# Patient Record
Sex: MALE | Race: BLACK OR AFRICAN AMERICAN | NOT HISPANIC OR LATINO | Employment: OTHER | URBAN - METROPOLITAN AREA
[De-identification: names, ages, dates, MRNs, and addresses within clinical notes are randomized per-mention and may not be internally consistent; named-entity substitution may affect disease eponyms.]

---

## 2017-03-31 RX ORDER — HYDROCODONE BITARTRATE AND ACETAMINOPHEN 5; 325 MG/1; MG/1
1 TABLET ORAL EVERY 6 HOURS PRN
Qty: 45 TABLET | Refills: 0 | Status: SHIPPED | OUTPATIENT
Start: 2017-03-31 | End: 2017-07-06 | Stop reason: SDUPTHER

## 2017-04-02 ENCOUNTER — HOSPITAL ENCOUNTER (EMERGENCY)
Facility: HOSPITAL | Age: 82
Discharge: NURSING, REHAB OR LONG-TERM CARE | End: 2017-04-02
Attending: EMERGENCY MEDICINE
Payer: MEDICARE

## 2017-04-02 VITALS
DIASTOLIC BLOOD PRESSURE: 63 MMHG | WEIGHT: 197 LBS | RESPIRATION RATE: 12 BRPM | TEMPERATURE: 98 F | HEART RATE: 75 BPM | OXYGEN SATURATION: 96 % | SYSTOLIC BLOOD PRESSURE: 113 MMHG | BODY MASS INDEX: 29.86 KG/M2 | HEIGHT: 68 IN

## 2017-04-02 DIAGNOSIS — R10.84 GENERALIZED ABDOMINAL PAIN: Primary | ICD-10-CM

## 2017-04-02 LAB
ALBUMIN SERPL BCP-MCNC: 3.4 G/DL
ALP SERPL-CCNC: 57 U/L
ALT SERPL W/O P-5'-P-CCNC: 19 U/L
ANION GAP SERPL CALC-SCNC: 10 MMOL/L
AST SERPL-CCNC: 21 U/L
BASOPHILS # BLD AUTO: 0.03 K/UL
BASOPHILS NFR BLD: 0.4 %
BILIRUB SERPL-MCNC: 0.2 MG/DL
BILIRUB UR QL STRIP: NEGATIVE
BUN SERPL-MCNC: 14 MG/DL
CALCIUM SERPL-MCNC: 8.9 MG/DL
CHLORIDE SERPL-SCNC: 107 MMOL/L
CLARITY UR: CLEAR
CO2 SERPL-SCNC: 22 MMOL/L
COLOR UR: YELLOW
CREAT SERPL-MCNC: 0.8 MG/DL
DIFFERENTIAL METHOD: ABNORMAL
EOSINOPHIL # BLD AUTO: 0.4 K/UL
EOSINOPHIL NFR BLD: 6 %
ERYTHROCYTE [DISTWIDTH] IN BLOOD BY AUTOMATED COUNT: 15.1 %
EST. GFR  (AFRICAN AMERICAN): >60 ML/MIN/1.73 M^2
EST. GFR  (NON AFRICAN AMERICAN): >60 ML/MIN/1.73 M^2
GLUCOSE SERPL-MCNC: 90 MG/DL
GLUCOSE UR QL STRIP: NEGATIVE
HCT VFR BLD AUTO: 40 %
HGB BLD-MCNC: 12.7 G/DL
HGB UR QL STRIP: NEGATIVE
KETONES UR QL STRIP: NEGATIVE
LEUKOCYTE ESTERASE UR QL STRIP: NEGATIVE
LIPASE SERPL-CCNC: 38 U/L
LYMPHOCYTES # BLD AUTO: 2.2 K/UL
LYMPHOCYTES NFR BLD: 30.8 %
MCH RBC QN AUTO: 28 PG
MCHC RBC AUTO-ENTMCNC: 31.8 %
MCV RBC AUTO: 88 FL
MONOCYTES # BLD AUTO: 0.6 K/UL
MONOCYTES NFR BLD: 7.6 %
NEUTROPHILS # BLD AUTO: 4 K/UL
NEUTROPHILS NFR BLD: 54.9 %
NITRITE UR QL STRIP: NEGATIVE
PH UR STRIP: 6 [PH] (ref 5–8)
PLATELET # BLD AUTO: 178 K/UL
PMV BLD AUTO: 12.3 FL
POTASSIUM SERPL-SCNC: 4.5 MMOL/L
PROT SERPL-MCNC: 6.9 G/DL
PROT UR QL STRIP: NEGATIVE
RBC # BLD AUTO: 4.53 M/UL
SODIUM SERPL-SCNC: 139 MMOL/L
SP GR UR STRIP: 1.01 (ref 1–1.03)
URN SPEC COLLECT METH UR: NORMAL
UROBILINOGEN UR STRIP-ACNC: NEGATIVE EU/DL
WBC # BLD AUTO: 7.2 K/UL

## 2017-04-02 PROCEDURE — 80053 COMPREHEN METABOLIC PANEL: CPT

## 2017-04-02 PROCEDURE — 83690 ASSAY OF LIPASE: CPT

## 2017-04-02 PROCEDURE — 81003 URINALYSIS AUTO W/O SCOPE: CPT

## 2017-04-02 PROCEDURE — 25500020 PHARM REV CODE 255: Performed by: EMERGENCY MEDICINE

## 2017-04-02 PROCEDURE — 93010 ELECTROCARDIOGRAM REPORT: CPT | Mod: ,,, | Performed by: INTERNAL MEDICINE

## 2017-04-02 PROCEDURE — 85025 COMPLETE CBC W/AUTO DIFF WBC: CPT

## 2017-04-02 PROCEDURE — 93005 ELECTROCARDIOGRAM TRACING: CPT

## 2017-04-02 PROCEDURE — 99284 EMERGENCY DEPT VISIT MOD MDM: CPT

## 2017-04-02 RX ORDER — POLYETHYLENE GLYCOL 3350 17 G/17G
17 POWDER, FOR SOLUTION ORAL DAILY
Qty: 289 G | Refills: 0 | Status: SHIPPED | OUTPATIENT
Start: 2017-04-02

## 2017-04-02 RX ADMIN — IOHEXOL 100 ML: 350 INJECTION, SOLUTION INTRAVENOUS at 09:04
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
## 2017-04-02 NOTE — ED AVS SNAPSHOT
[facility]  [address]  [place] [address]               [facility] [place]   2017  6:10 PM   ED    Description:  Male : 1935   Department:  [facility]           Your Care was Coordinated By:     Provider Role From To    Cristobal Larsen MD Attending Provider 17 [phone] --      Reason for Visit     Altered Mental Status     Abdominal Pain           Diagnoses this Visit        Comments    Generalized abdominal pain    -  Primary       ED Disposition     ED Disposition Condition Comment    Discharge             To Do List           Follow-up Information     Follow up with Orlando Araujo MD In 2 days.    Specialty:  Internal Medicine    Contact information:    [address]  [address]  [facility] [address]  [phone]         These Medications        Disp Refills Start End    polyethylene glycol (GLYCOLAX) 17 gram/dose powder 289 g 0 2017     Take 17 g by mouth once daily. - Oral    Pharmacy: [facility] - [address] Ph #: [phone]         [facility]     [facility] -  Assistance  Unless otherwise directed by your provider, please contact [facility], our nurse care line that is available for  assistance.     Registered nurses in the [facility] provide: appointment scheduling, clinical advisement, health education, and other advisory services.  Call: [phone] (toll free)               Medications           Message regarding Medications     Verify the changes and/or additions to your medication regime listed below are the same as discussed with your clinician today.  If any of these changes or additions are incorrect, please notify your healthcare provider.        START taking these NEW medications        Refills    polyethylene glycol (GLYCOLAX) 17 gram/dose powder 0    Sig: Take 17 g by mouth once daily.    Class: Print    Route: Oral       These medications were administered today        Dose Freq    omnipaque 350 iohexol 100 mL 100 mL IMG once as needed    Sig: Inject 100 mLs into the vein ONCE PRN for contrast.    Class: Normal    Route: Intravenous           Verify that the below list of medications is an accurate representation of the medications you are currently taking.  If none reported, the list may be blank. If incorrect, please contact your healthcare provider. Carry this list with you in case of emergency.           Current Medications     levothyroxine (SYNTHROID) 50 MCG tablet Take 50 mcg by mouth once daily.    montelukast (SINGULAIR) 10 mg tablet Take 10 mg by mouth once daily.     albuterol (PROVENTIL) 2.5 mg /3 mL (0.083 %) nebulizer solution Take 2.5 mg by nebulization every 6 (six) hours as needed for Wheezing or Shortness of Breath.    baclofen (LIORESAL) 20 MG tablet Take 20 mg by mouth 3 (three) times daily.    beclomethasone (QVAR) 80 mcg/actuation Aero Inhale 2 puffs into the lungs 2 (two) times daily.    carboxymethylcellulose sodium (REFRESH TEARS) 0.5 % Drop Apply 1 drop to eye every other day. For eye dryness.    carvedilol (COREG) 3.125 MG tablet Take 3.125 mg by mouth 2 (two) times daily with meals.    cetirizine (ZYRTEC) 10 MG tablet Take 10 mg by mouth once daily.    duloxetine (CYMBALTA) 60 MG capsule Take 60 mg by mouth once daily.    ferrous sulfate 325 (65 FE) MG EC tablet Take 325 mg by mouth 2 (two) times daily.    flunisolide 29 mcg (0.025 %) Spry 1 spray by Nasal route 2 (two) times daily.     guaifenesin (MUCINEX) 600 mg 12 hr tablet Take 600 mg by mouth 2 (two) times daily as needed for Congestion.    GUAIFENESIN/DEXTROMETHORPHAN (ROBITUSSIN-DM ORAL) Take 10 mLs by mouth every 6 (six) hours as needed (cough).    hydrocodone-acetaminophen 5-325mg (NORCO) 5-325 mg per tablet Take 1 tablet by mouth every 6 (six) hours as needed for Pain.    loperamide (IMODIUM) 2 mg capsule Take 2 mg by mouth every 8 (eight)  "hours as needed for Diarrhea.    omeprazole (PRILOSEC) 20 MG capsule Take 20 mg by mouth once daily.    polyethylene glycol (GLYCOLAX) 17 gram PwPk Take 17 g by mouth as needed (constipation).    polyethylene glycol (GLYCOLAX) 17 gram/dose powder Take 17 g by mouth once daily.    rivaroxaban (XARELTO) 20 mg Tab Take 20 mg by mouth once daily.    simvastatin (ZOCOR) 20 MG tablet Take 20 mg by mouth every evening.    trazodone (DESYREL) 50 MG tablet Take 50 mg by mouth every evening.           Clinical Reference Information           Your Vitals Were     BP Pulse Temp Resp Height Weight    134/88 66 97.7 °F (36.5 °C) (Oral) 15 5' 8" (1.727 m) 89.4 kg (197 lb)    SpO2 BMI             95% 29.95 kg/m2         Allergies as of 4/2/2017        Reactions    Aspirin Hives, Shortness Of Breath, Swelling      Immunizations Administered on Date of Encounter - 4/2/2017     None      ED Micro, Lab, POCT     Start Ordered       Status Ordering Provider    04/02/17 1833 04/02/17 1833  CBC auto differential  STAT      Final result     04/02/17 1833 04/02/17 1833  Comprehensive metabolic panel  STAT      Final result     04/02/17 1833 04/02/17 1833  Urinalysis  STAT      Final result     04/02/17 1833 04/02/17 1833  Lipase  STAT      Final result       ED Imaging Orders     Start Ordered       Status Ordering Provider    04/02/17 2059 04/02/17 2059  CT Abdomen Pelvis With Contrast  1 time imaging      Final result         Discharge Instructions         Take your medications as prescribed.  Follow up with your primary care physician if you're not improving in 2-3 days.  Please refer to the additional material providing further information including when return to the emergency department.  Abdominal Pain    Abdominal pain is pain in the stomach or belly area. Everyone has this pain from time to time. In many cases it goes away on its own. But abdominal pain can sometimes be due to a serious problem, such as appendicitis. So its " important to know when to seek help.  Causes of abdominal pain  There are many possible causes of abdominal pain. Common causes in adults include:  · Constipation, diarrhea, or gas  · Stomach acid flowing back up into the esophagus (acid reflux or heartburn)  · Severe acid reflux, called GERD (gastroesophageal reflux disease)  · A sore in the lining of the stomach or small intestine (peptic ulcer)  · Inflammation of the gallbladder, liver, or pancreas  · Gallstones or kidney stones  · Appendicitis   · Intestinal blockage   · An internal organ pushing through a muscle or other tissue (hernia)  · Urinary tract infections  · In women, menstrual cramps, fibroids, or endometriosis  · Inflammation or infection of the intestines  Diagnosing the cause of abdominal pain  Your healthcare provider will do a physical exam help find the cause of your pain. If needed, tests will be ordered. Belly pain has many possible causes. So it can be hard to find the reason for your pain. Giving details about your pain can help. Tell your provider where and when you feel the pain, and what makes it better or worse. Also let your provider know if you have other symptoms such as:  · Fever  · Tiredness  · Upset stomach (nausea)  · Vomiting  · Changes in bathroom habits  Treating abdominal pain  Some causes of pain need emergency medical treatment right away. These include appendicitis or a bowel blockage. Other problems can be treated with rest, fluids, or medicines. Your healthcare provider can give you specific instructions for treatment or self-care based on what is causing your pain.  If you have vomiting or diarrhea, sip water or other clear fluids. When you are ready to eat solid foods again, start with small amounts of easy-to-digest, low-fat foods. These include apple sauce, toast, or crackers.   When to seek medical care  Call 911 or go to the hospital right away if you:  · Cant pass stool and are vomiting  · Are vomiting blood or  have bloody diarrhea or black, tarry diarrhea  · Have chest, neck, or shoulder pain  · Feel like you might pass out  · Have pain in your shoulder blades with nausea  · Have sudden, severe belly pain  · Have new, severe pain unlike any you have felt before  · Have a belly that is rigid, hard, and tender to touch  Call your healthcare provider if you have:  · Pain for more than 5 days  · Bloating for more than 2 days  · Diarrhea for more than 5 days  · A fever of 100.4°F (38.0°C) or higher, or as directed by your provider  · Pain that gets worse  · Weight loss for no reason  · Continued lack of appetite  · Blood in your stool  How to prevent abdominal pain  Here are some tips to help prevent abdominal pain:  · Eat smaller amounts of food at one time.  · Avoid greasy, fried, or other high-fat foods.  · Avoid foods that give you gas.  · Exercise regularly.  · Drink plenty of fluids.  To help prevent GERD symptoms:  · Quit smoking.  · Reduce alcohol and certain foods that increase stomach acid.  · Avoid aspirin and over-the-counter pain and fever medicines (NSAIDS or nonsteroidal anti-inflammatory drugs), if possible  · Lose extra weight.  · Finish eating at least 2 hours before you go to bed or lie down.  · Raise the head of your bed.  Date Last Reviewed: 7/1/2016  © 7478-5774 [employer]. [address] [place] [address]. All rights reserved. This information is not intended as a substitute for professional medical care. Always follow your healthcare professional's instructions.          [facility] Sign-Up     Activating your [facility] account is as easy as 1-2-3!     1) Visit [email or web address], select Sign Up Now, enter this activation code and your date of birth, then select Next.  [email or web address]  Expires: 5/17/2017 10:00 PM      2) Create a username and password to use when you visit [facility] in the future and select a security question in case you lose your password and select Next.    3)  Enter your e-mail address and click Sign Up!    Additional Information  If you have questions, please e-mail [email or web address] or call [phone] to talk to our [facility] staff. Remember, [facility] is NOT to be used for urgent needs. For medical emergencies, dial 911.          [facility] complies with applicable Federal civil rights laws and does not discriminate on the basis of race, color, national origin, age, disability, or sex.        Language Assistance Services     ATTENTION: Language assistance services are available, free of charge. Please call [phone].      ATENCIÓN: Si habla español, tiene a field disposición servicios gratuitos de asistencia lingüística. Llame al [phone].     CHÚ Ý: N?u b?n nói Ti?ng Vi?t, có các d?ch v? h? tr? ngôn ng? mi?n phí dành cho b?n. G?i s? [phone].

## 2017-04-03 NOTE — ED TRIAGE NOTES
Pt. Care assumed, pt. Awake, alert and oriented, skin PWD. Denies c/o pain or discomfort. Pt. Aware of plan of care to return to [facility]. Pt. Given a blanket for comfort, will continue to monitor.

## 2017-04-03 NOTE — ED NOTES
Report called to [facility]. --Bee-with results, VS and recc for stool softener/laxative.

## 2017-04-03 NOTE — DISCHARGE INSTRUCTIONS
Take your medications as prescribed.  Follow up with your primary care physician if you're not improving in 2-3 days.  Please refer to the additional material providing further information including when return to the emergency department.  Abdominal Pain    Abdominal pain is pain in the stomach or belly area. Everyone has this pain from time to time. In many cases it goes away on its own. But abdominal pain can sometimes be due to a serious problem, such as appendicitis. So its important to know when to seek help.  Causes of abdominal pain  There are many possible causes of abdominal pain. Common causes in adults include:  · Constipation, diarrhea, or gas  · Stomach acid flowing back up into the esophagus (acid reflux or heartburn)  · Severe acid reflux, called GERD (gastroesophageal reflux disease)  · A sore in the lining of the stomach or small intestine (peptic ulcer)  · Inflammation of the gallbladder, liver, or pancreas  · Gallstones or kidney stones  · Appendicitis   · Intestinal blockage   · An internal organ pushing through a muscle or other tissue (hernia)  · Urinary tract infections  · In women, menstrual cramps, fibroids, or endometriosis  · Inflammation or infection of the intestines  Diagnosing the cause of abdominal pain  Your healthcare provider will do a physical exam help find the cause of your pain. If needed, tests will be ordered. Belly pain has many possible causes. So it can be hard to find the reason for your pain. Giving details about your pain can help. Tell your provider where and when you feel the pain, and what makes it better or worse. Also let your provider know if you have other symptoms such as:  · Fever  · Tiredness  · Upset stomach (nausea)  · Vomiting  · Changes in bathroom habits  Treating abdominal pain  Some causes of pain need emergency medical treatment right away. These include appendicitis or a bowel blockage. Other problems can be treated with rest, fluids, or  medicines. Your healthcare provider can give you specific instructions for treatment or self-care based on what is causing your pain.  If you have vomiting or diarrhea, sip water or other clear fluids. When you are ready to eat solid foods again, start with small amounts of easy-to-digest, low-fat foods. These include apple sauce, toast, or crackers.   When to seek medical care  Call 911 or go to the hospital right away if you:  · Cant pass stool and are vomiting  · Are vomiting blood or have bloody diarrhea or black, tarry diarrhea  · Have chest, neck, or shoulder pain  · Feel like you might pass out  · Have pain in your shoulder blades with nausea  · Have sudden, severe belly pain  · Have new, severe pain unlike any you have felt before  · Have a belly that is rigid, hard, and tender to touch  Call your healthcare provider if you have:  · Pain for more than 5 days  · Bloating for more than 2 days  · Diarrhea for more than 5 days  · A fever of 100.4°F (38.0°C) or higher, or as directed by your provider  · Pain that gets worse  · Weight loss for no reason  · Continued lack of appetite  · Blood in your stool  How to prevent abdominal pain  Here are some tips to help prevent abdominal pain:  · Eat smaller amounts of food at one time.  · Avoid greasy, fried, or other high-fat foods.  · Avoid foods that give you gas.  · Exercise regularly.  · Drink plenty of fluids.  To help prevent GERD symptoms:  · Quit smoking.  · Reduce alcohol and certain foods that increase stomach acid.  · Avoid aspirin and over-the-counter pain and fever medicines (NSAIDS or nonsteroidal anti-inflammatory drugs), if possible  · Lose extra weight.  · Finish eating at least 2 hours before you go to bed or lie down.  · Raise the head of your bed.  Date Last Reviewed: 7/1/2016  © 5524-0520 [employer]. [address] [place] [address], [place] [address]. All rights reserved. This information is not intended as a substitute for professional  medical care. Always follow your healthcare professional's instructions.

## 2017-04-03 NOTE — ED PROVIDER NOTES
Encounter Date: 4/2/2017       History     Chief Complaint   Patient presents with    Altered Mental Status     ns[facility] home said pt  confused, pt not confused when ems arrived.    Abdominal Pain     Review of patient's allergies indicates:   Allergen Reactions    Aspirin Hives, Shortness Of Breath and Swelling     HPI Comments: CHIEF COMPLAINT: Abdominal pain    HISTORY OF PRESENT ILLNESS: This is an 81-year-old who is brought into the emergency department from the nursing home today for complaint of abdominal pain.  According to EMS they were also told that the patient had altered mental status but upon their arrival the patient was talking and responding. The patient reports here today that he was having some abdominal pain today around the area of his stoma to his colostomy bag. It occurred when they were changing it today. It no longer hurts at that area. He does report feeling more bloated lately.  He has had no vomiting. No loose stools. Stool in bag does not appear watery. No change in urination. No fever, chills or sweats.     REVIEW OF SYSTEMS:  Constitutional: No fever, no chills.  Eyes: No discharge. No pain.  HENT: No nasal drainage. No ear ache. No sore throat.  Cardiovascular: No chest pain, no palpitations.  Respiratory: No cough, no shortness of breath.  Gastrointestinal: See above.   Genitourinary: No hematuria, dysuria, urgency.  Musculoskeletal: No back pain.  Skin: No rashes, no lesions.  Neurological: No headache, no focal weakness.    ALLERGIES reviewed  Family history reviewed  Home medications reviewed  Problem list reviewed        The history is provided by the patient.     Past Medical History:   Diagnosis Date    Allergy     Anxiety     Asthma     Colon polyp     benign    Depression     Elevated PSA     Encounter for blood transfusion 10/5/2014    for GI bleed anemia    Gastritis     GI bleed     Hyperlipemia     Hypertension     Hypothyroidism     Insomnia     Malignant  neoplasm of colon, unspecified site     Malignant neoplasm of prostate     Multiple sclerosis     Osteoporosis     Paroxysmal atrial fibrillation     Phlebitis and thrombophlebitis of unspecified femoral vein     Prostate cancer      Past Surgical History:   Procedure Laterality Date    APPENDECTOMY      BOWEL RESECTION      COLON SURGERY      COLONOSCOPY N/A 12/15/2015    Procedure: COLONOSCOPY;  Surgeon: Amari Busby MD;  Location: [facility] ([address] [place] [address]);  Service: Endoscopy;  Laterality: N/A;    CYSTOSCOPY      HERNIA REPAIR      LEFT COLECTOMY Left 12/21/2015    colon cancer     Family History   Problem Relation Age of Onset    Cervical cancer Sister     Breast cancer Sister     Heart disease Father     Stroke Father      Social History   Substance Use Topics    Smoking status: Never Smoker    Smokeless tobacco: None    Alcohol use No     Review of Systems   All other systems reviewed and are negative.      Physical Exam   Initial Vitals   BP Pulse Resp Temp SpO2   04/02/17 1759 04/02/17 1759 04/02/17 1759 -- 04/02/17 1759   150/6 74 18  96 %     Physical Exam    Nursing note and vitals reviewed.  Constitutional: He appears well-developed and well-nourished. He is not diaphoretic. No distress.   HENT:   Head: Normocephalic and atraumatic.   Nose: Nose normal.   Mouth/Throat: Oropharynx is clear and moist.   Eyes: Conjunctivae and EOM are normal. Pupils are equal, round, and reactive to light. No scleral icterus.   Neck: Normal range of motion. Neck supple. No JVD present.   Cardiovascular: Normal rate, regular rhythm and normal heart sounds. Exam reveals no gallop and no friction rub.    No murmur heard.  Pulmonary/Chest: Breath sounds normal. No stridor. No respiratory distress. He has no wheezes. He exhibits no tenderness.   Abdominal: Soft. Bowel sounds are normal. He exhibits no mass. There is no tenderness. There is no rebound and no guarding.   Obese abdomen but soft. Colostomy bag  in place. NTTP around the bag site. NTTP in all quadrants of the abdomen. No guarding or rebound. No pulsatile mass.    Musculoskeletal: He exhibits no edema.   Contractures present to the right upper extremity secondary to MS, No edema to the LE. NTTP.    Lymphadenopathy:     He has no cervical adenopathy.   Neurological: He is alert and oriented to person, place, and time. No cranial nerve deficit.   Skin: Skin is warm and dry. No rash noted. No pallor.         ED Course   Procedures  Labs Reviewed   CBC W/ AUTO DIFFERENTIAL   COMPREHENSIVE METABOLIC PANEL   URINALYSIS   LIPASE     EKG Readings: (Independently Interpreted)   Initial Reading: No STEMI.   71 bpm, normal sinus rhythm, ST and T wave abnormalities in the inferior leads, ST and T-wave abnormalities in the anterior lateral leads, prolonged QTC, abnormal EKG but similar to previous.          Medical Decision Making:   History:   Old Records Summarized: records from clinic visits.  Initial Assessment:   This is an 81-year-old presents to the emergency department with some abdominal pain that was centered around the colostomy site is been diffusely.  We will obtain labs and likely get a CT scan for further assessment.  Differential Diagnosis:   Gastritis, gastroenteritis, cholecystitis, cholelithiasis, pancreatitis, small bowel obstruction, ileus, appendicitis, urinary tract infection.  Independently Interpreted Test(s):   I have ordered and independently interpreted EKG Reading(s) - see prior notes  Clinical Tests:   Lab Tests: Ordered and Reviewed  Radiological Study: Ordered and Reviewed  Medical Tests: Ordered and Reviewed  ED Management:  Patient presented to the emergency Department today with complaint of abdominal pain.  At this time his workup does not reveal any acute abnormality.  CT scan does not show any signs of obstruction, no signs of inflammation, no signs of infection or abscess.  At this time there is no need for emergent hospitalization.   We will prescribe him some MiraLAX in order to encourage softer stool passage and have him follow-up with his primary care physician.  The patient is comfortable with this plan.  After taking into careful account the historical factors and physical exam findings of the patient's presentation today, in conjunction with the empirical and objective data obtained on ED workup, no acute emergent medical condition has been identified. The patient appears to be low risk for an emergent medical condition and I feel it is safe and appropriate at this time for the patient to be discharged to follow-up as detailed in their discharge instructions for reevaluation and possible continued outpatient workup and management. I have discussed the specifics of the workup with the patient and the patient has verbalized understanding of the details of the workup, the diagnosis, the treatment plan, and the need for outpatient follow-up. In addition, I have advised the patient that they can return to the ED and/or activate EMS at any time with worsening of their symptoms, change of their symptoms, or with any other medical complaint. The patient remained comfortable and stable during their visit in the ED.  Discharge and follow-up instructions discussed with the patient who expressed understanding and willingness to comply with my recommendations.     This medical record was prepared using voice dictation software. There may be phonetic errors.                     ED Course    the patient has remained stable here in the emergency department.  He has had no signs of any altered mental status here in the emergency department.  Given his pain is pain around the colostomy site we will get a CT scan for further evaluation.    The patient's CT scan shows no acute abnormality which required hospitalization at this time.  He will be discharged home on MiraLAX to follow-up with his primary care physician.  The patient is comfortable with this plan  and comfortable going home at this time.  Clinical Impression:   The encounter diagnosis was Generalized abdominal pain.          Cristobal Larsen MD  04/02/17 3664

## 2017-05-04 PROCEDURE — 99304 1ST NF CARE SF/LOW MDM 25: CPT | Mod: ,,, | Performed by: INTERNAL MEDICINE

## 2017-05-10 ENCOUNTER — OUTSIDE PLACE OF SERVICE (OUTPATIENT)
Dept: ADMINISTRATIVE | Facility: OTHER | Age: 82
End: 2017-05-10
Payer: MEDICARE

## 2017-07-06 DIAGNOSIS — M79.10 MUSCULAR PAIN: Primary | ICD-10-CM

## 2017-07-06 RX ORDER — HYDROCODONE BITARTRATE AND ACETAMINOPHEN 5; 325 MG/1; MG/1
1 TABLET ORAL EVERY 6 HOURS PRN
Qty: 45 TABLET | Refills: 0 | Status: SHIPPED | OUTPATIENT
Start: 2017-07-06

## 2017-07-13 ENCOUNTER — OUTSIDE PLACE OF SERVICE (OUTPATIENT)
Dept: INTERNAL MEDICINE | Facility: CLINIC | Age: 82
End: 2017-07-13
Payer: MEDICARE

## 2017-07-13 PROCEDURE — 99309 SBSQ NF CARE MODERATE MDM 30: CPT | Mod: ,,, | Performed by: INTERNAL MEDICINE

## 2017-10-19 ENCOUNTER — OUTSIDE PLACE OF SERVICE (OUTPATIENT)
Dept: INTERNAL MEDICINE | Facility: CLINIC | Age: 82
End: 2017-10-19
Payer: MEDICARE

## 2017-10-19 PROCEDURE — 99308 SBSQ NF CARE LOW MDM 20: CPT | Mod: ,,, | Performed by: INTERNAL MEDICINE

## 2017-10-26 ENCOUNTER — OUTSIDE PLACE OF SERVICE (OUTPATIENT)
Dept: INTERNAL MEDICINE | Facility: CLINIC | Age: 82
End: 2017-10-26
Payer: MEDICARE

## 2017-10-26 PROCEDURE — 99308 SBSQ NF CARE LOW MDM 20: CPT | Mod: ,,, | Performed by: INTERNAL MEDICINE

## 2017-12-04 ENCOUNTER — HOSPITAL ENCOUNTER (EMERGENCY)
Facility: HOSPITAL | Age: 82
Discharge: HOME | End: 2017-12-04
Attending: EMERGENCY MEDICINE
Payer: MEDICARE

## 2017-12-04 VITALS
WEIGHT: 197 LBS | RESPIRATION RATE: 18 BRPM | DIASTOLIC BLOOD PRESSURE: 87 MMHG | OXYGEN SATURATION: 95 % | HEART RATE: 89 BPM | SYSTOLIC BLOOD PRESSURE: 151 MMHG | BODY MASS INDEX: 29.86 KG/M2 | HEIGHT: 68 IN | TEMPERATURE: 98 F

## 2017-12-04 DIAGNOSIS — R07.89 CHEST WALL PAIN: Primary | ICD-10-CM

## 2017-12-04 DIAGNOSIS — J45.21 MILD INTERMITTENT ASTHMA WITH EXACERBATION: ICD-10-CM

## 2017-12-04 DIAGNOSIS — R07.9 CHEST PAIN: ICD-10-CM

## 2017-12-04 LAB
ALBUMIN SERPL BCP-MCNC: 3.7 G/DL
ALP SERPL-CCNC: 68 U/L
ALT SERPL W/O P-5'-P-CCNC: 14 U/L
ANION GAP SERPL CALC-SCNC: 8 MMOL/L
APTT BLDCRRT: 30.4 SEC
AST SERPL-CCNC: 19 U/L
BASOPHILS # BLD AUTO: 0.02 K/UL
BASOPHILS NFR BLD: 0.3 %
BILIRUB SERPL-MCNC: 0.7 MG/DL
BNP SERPL-MCNC: 125 PG/ML
BUN SERPL-MCNC: 11 MG/DL
CALCIUM SERPL-MCNC: 9.3 MG/DL
CHLORIDE SERPL-SCNC: 103 MMOL/L
CO2 SERPL-SCNC: 27 MMOL/L
CREAT SERPL-MCNC: 0.8 MG/DL
DIFFERENTIAL METHOD: ABNORMAL
EOSINOPHIL # BLD AUTO: 0.6 K/UL
EOSINOPHIL NFR BLD: 7.9 %
ERYTHROCYTE [DISTWIDTH] IN BLOOD BY AUTOMATED COUNT: 15.2 %
EST. GFR  (AFRICAN AMERICAN): >60 ML/MIN/1.73 M^2
EST. GFR  (NON AFRICAN AMERICAN): >60 ML/MIN/1.73 M^2
GLUCOSE SERPL-MCNC: 106 MG/DL
HCT VFR BLD AUTO: 44.5 %
HGB BLD-MCNC: 14.1 G/DL
INR PPP: 1.1
LYMPHOCYTES # BLD AUTO: 1.7 K/UL
LYMPHOCYTES NFR BLD: 23.3 %
MCH RBC QN AUTO: 27.8 PG
MCHC RBC AUTO-ENTMCNC: 31.7 G/DL
MCV RBC AUTO: 88 FL
MONOCYTES # BLD AUTO: 0.5 K/UL
MONOCYTES NFR BLD: 7.4 %
NEUTROPHILS # BLD AUTO: 4.4 K/UL
NEUTROPHILS NFR BLD: 60.6 %
PLATELET # BLD AUTO: 224 K/UL
PMV BLD AUTO: 12.3 FL
POTASSIUM SERPL-SCNC: 4.2 MMOL/L
PROT SERPL-MCNC: 7.8 G/DL
PROTHROMBIN TIME: 12.1 SEC
RBC # BLD AUTO: 5.07 M/UL
SODIUM SERPL-SCNC: 138 MMOL/L
TROPONIN I SERPL DL<=0.01 NG/ML-MCNC: 0.05 NG/ML
TROPONIN I SERPL DL<=0.01 NG/ML-MCNC: 0.05 NG/ML
TSH SERPL DL<=0.005 MIU/L-ACNC: 1.98 UIU/ML
WBC # BLD AUTO: 7.33 K/UL

## 2017-12-04 PROCEDURE — 96374 THER/PROPH/DIAG INJ IV PUSH: CPT

## 2017-12-04 PROCEDURE — 25000242 PHARM REV CODE 250 ALT 637 W/ HCPCS: Performed by: EMERGENCY MEDICINE

## 2017-12-04 PROCEDURE — 94640 AIRWAY INHALATION TREATMENT: CPT

## 2017-12-04 PROCEDURE — 96376 TX/PRO/DX INJ SAME DRUG ADON: CPT

## 2017-12-04 PROCEDURE — 94761 N-INVAS EAR/PLS OXIMETRY MLT: CPT

## 2017-12-04 PROCEDURE — 85730 THROMBOPLASTIN TIME PARTIAL: CPT

## 2017-12-04 PROCEDURE — 83880 ASSAY OF NATRIURETIC PEPTIDE: CPT

## 2017-12-04 PROCEDURE — 25000003 PHARM REV CODE 250: Performed by: EMERGENCY MEDICINE

## 2017-12-04 PROCEDURE — 85610 PROTHROMBIN TIME: CPT

## 2017-12-04 PROCEDURE — 93005 ELECTROCARDIOGRAM TRACING: CPT

## 2017-12-04 PROCEDURE — 25000242 PHARM REV CODE 250 ALT 637 W/ HCPCS: Performed by: NURSE PRACTITIONER

## 2017-12-04 PROCEDURE — 25000003 PHARM REV CODE 250: Performed by: NURSE PRACTITIONER

## 2017-12-04 PROCEDURE — 99285 EMERGENCY DEPT VISIT HI MDM: CPT | Mod: 25

## 2017-12-04 PROCEDURE — 84484 ASSAY OF TROPONIN QUANT: CPT

## 2017-12-04 PROCEDURE — 84484 ASSAY OF TROPONIN QUANT: CPT | Mod: 91

## 2017-12-04 PROCEDURE — 84443 ASSAY THYROID STIM HORMONE: CPT

## 2017-12-04 PROCEDURE — 80053 COMPREHEN METABOLIC PANEL: CPT

## 2017-12-04 PROCEDURE — 63600175 PHARM REV CODE 636 W HCPCS: Performed by: EMERGENCY MEDICINE

## 2017-12-04 PROCEDURE — 85025 COMPLETE CBC W/AUTO DIFF WBC: CPT

## 2017-12-04 PROCEDURE — 63600175 PHARM REV CODE 636 W HCPCS: Performed by: NURSE PRACTITIONER

## 2017-12-04 PROCEDURE — 27000221 HC OXYGEN, UP TO 24 HOURS

## 2017-12-04 RX ORDER — LIDOCAINE 50 MG/G
1 PATCH TOPICAL
Status: DISCONTINUED | OUTPATIENT
Start: 2017-12-04 | End: 2017-12-04 | Stop reason: HOSPADM

## 2017-12-04 RX ORDER — OXYCODONE AND ACETAMINOPHEN 5; 325 MG/1; MG/1
1 TABLET ORAL
Status: COMPLETED | OUTPATIENT
Start: 2017-12-04 | End: 2017-12-04

## 2017-12-04 RX ORDER — MORPHINE SULFATE 2 MG/ML
2 INJECTION, SOLUTION INTRAMUSCULAR; INTRAVENOUS
Status: DISCONTINUED | OUTPATIENT
Start: 2017-12-04 | End: 2017-12-04

## 2017-12-04 RX ORDER — MORPHINE SULFATE 10 MG/ML
2 INJECTION INTRAMUSCULAR; INTRAVENOUS; SUBCUTANEOUS
Status: COMPLETED | OUTPATIENT
Start: 2017-12-04 | End: 2017-12-04

## 2017-12-04 RX ORDER — MORPHINE SULFATE 2 MG/ML
2 INJECTION, SOLUTION INTRAMUSCULAR; INTRAVENOUS
Status: DISCONTINUED | OUTPATIENT
Start: 2017-12-04 | End: 2017-12-04 | Stop reason: RX

## 2017-12-04 RX ORDER — ALBUTEROL SULFATE 0.83 MG/ML
2.5 SOLUTION RESPIRATORY (INHALATION) ONCE
Status: COMPLETED | OUTPATIENT
Start: 2017-12-04 | End: 2017-12-04

## 2017-12-04 RX ORDER — ONDANSETRON 4 MG/1
4 TABLET, ORALLY DISINTEGRATING ORAL
Status: COMPLETED | OUTPATIENT
Start: 2017-12-04 | End: 2017-12-04

## 2017-12-04 RX ORDER — IPRATROPIUM BROMIDE AND ALBUTEROL SULFATE 2.5; .5 MG/3ML; MG/3ML
3 SOLUTION RESPIRATORY (INHALATION)
Status: COMPLETED | OUTPATIENT
Start: 2017-12-04 | End: 2017-12-04

## 2017-12-04 RX ORDER — ONDANSETRON 2 MG/ML
4 INJECTION INTRAMUSCULAR; INTRAVENOUS ONCE
Status: COMPLETED | OUTPATIENT
Start: 2017-12-04 | End: 2017-12-04

## 2017-12-04 RX ADMIN — OXYCODONE AND ACETAMINOPHEN 1 TABLET: 5; 325 TABLET ORAL at 04:12

## 2017-12-04 RX ADMIN — MORPHINE SULFATE 2 MG: 10 INJECTION INTRAVENOUS at 10:12

## 2017-12-04 RX ADMIN — LIDOCAINE 1 PATCH: 50 PATCH TOPICAL at 05:12

## 2017-12-04 RX ADMIN — ALBUTEROL SULFATE 2.5 MG: 2.5 SOLUTION RESPIRATORY (INHALATION) at 10:12

## 2017-12-04 RX ADMIN — IPRATROPIUM BROMIDE AND ALBUTEROL SULFATE 3 ML: .5; 3 SOLUTION RESPIRATORY (INHALATION) at 03:12

## 2017-12-04 RX ADMIN — MORPHINE SULFATE 2 MG: 10 INJECTION INTRAVENOUS at 12:12

## 2017-12-04 RX ADMIN — ONDANSETRON 4 MG: 4 TABLET, ORALLY DISINTEGRATING ORAL at 04:12

## 2017-12-04 RX ADMIN — ONDANSETRON 4 MG: 2 INJECTION INTRAMUSCULAR; INTRAVENOUS at 10:12

## 2017-12-04 NOTE — ED NOTES
Pt request something for pain and to drink. Pt advised of NPO status. Oral care provided by Yu. Md notified of pain.

## 2017-12-04 NOTE — ED NOTES
Pt continues to c/o right upper rib pain. Repositioned again. Ice pack applied. Pt is also now c/o nausea.

## 2017-12-04 NOTE — ED NOTES
Dr. Franco at bedside for reassessment. Pt denies any improvement in rib pain. Also continues to c/o SOB that improves with positioning.

## 2017-12-04 NOTE — ED NOTES
Assumed care of pt. Per EMS pt had a brief possible choking episode in the cafeteria at [facility]. Pt then began c/o right rib pain. Pt states this is new pain, but staff reported that pt has this pain chronically. Right upper rib pain is worse with movement and when coughing. Respirations are even, unlabored. O2 sat. % on room air on arrival. Expiratory wheezes throughout chest. Skin is warm, dry. Sinus rhythm on monitor.

## 2017-12-04 NOTE — ED NOTES
Pt is c/o return of shortness of breath. States he feels like he needs a breathing treatment. O2 sat. 92% on room air. Pt is tachypneic. Diminished breath sounds and expiratory wheezes noted.

## 2017-12-04 NOTE — ED NOTES
Pt is resting with eyes closed, opens eyes to voice. Pt requests more pain medication for his right rib pain

## 2017-12-04 NOTE — ED NOTES
Pt states pain has only mildly improved. Pt given 2 cups of ice water. Tolerated well. Assisted onto his left side with HOB elevated.

## 2017-12-04 NOTE — ED PROVIDER NOTES
"Encounter Date: 12/4/2017       History     Chief Complaint   Patient presents with    Chest Pain     pain to right ribs. denies injury. pt was brought in by [facility] from [facility]     81yo male here for "pain in my right upper rib cage." According to EMS, they were called to [facility] for a choking episode without complete airway obstruction.  The patient was able to cough once, and coughed up the piece of food.  While EMS was at the nursing home evaluating the patient, he complained of right rib pain.  Nursing home Staff reports his rib pain is chronic, and is due to his posture in his wheelchair.  No trauma, fever/chills, cough, hemoptysis, abdominal pain, vomiting, or rash.  The patient reports the pain is worse with certain positions.  No other complaints at this time.  Nursing home staff that the patient moans with any movement, which is normal for the patient.  They report he is at his baseline mental status and is status post CVA with right-sided weakness.      The history is provided by the patient, the EMS personnel and medical records.   Chest Pain   Illness onset: chronic. The chest pain is unchanged. Associated with: movement. At its most intense, the chest pain is at 10/10. The chest pain is currently at 10/10. Chest pain is worsened by certain positions. Pertinent negatives for primary symptoms include no fever, no shortness of breath, no cough, no palpitations, no abdominal pain, no nausea and no vomiting.   Associated symptoms include weakness (s/p CVA with right-sided weakness). He tried nothing for the symptoms. Risk factors include being elderly, male gender, obesity and sedentary lifestyle.   His past medical history is significant for hyperlipidemia and hypertension. Past medical history comments: asthma     Review of patient's allergies indicates:   Allergen Reactions    Aspirin Hives, Shortness Of Breath and Swelling     Past Medical History:   Diagnosis Date    Allergy  "    Anxiety     Asthma     Colon polyp     benign    Depression     Elevated PSA     Encounter for blood transfusion 10/5/2014    for GI bleed anemia    Gastritis     GI bleed     Hyperlipemia     Hypertension     Hypothyroidism     Insomnia     Malignant neoplasm of colon, unspecified site 12/15/2015    Malignant neoplasm of prostate     Multiple sclerosis     Osteoporosis     Paroxysmal atrial fibrillation     Phlebitis and thrombophlebitis of unspecified femoral vein     Prostate cancer      Past Surgical History:   Procedure Laterality Date    APPENDECTOMY      BOWEL RESECTION      COLON SURGERY      COLONOSCOPY N/A 12/15/2015    Procedure: COLONOSCOPY;  Surgeon: Amari Busby MD;  Location: [facility] ([address] [place] [address]);  Service: Endoscopy;  Laterality: N/A;    CYSTOSCOPY      HERNIA REPAIR      LEFT COLECTOMY Left 12/21/2015    colon cancer     Family History   Problem Relation Age of Onset    Cervical cancer Sister     Breast cancer Sister     Heart disease Father     Stroke Father      Social History   Substance Use Topics    Smoking status: Never Smoker    Smokeless tobacco: Not on file    Alcohol use No     Review of Systems   Constitutional: Negative for activity change, chills and fever.   HENT: Negative for congestion.    Respiratory: Negative for cough and shortness of breath.    Cardiovascular: Positive for chest pain. Negative for palpitations.   Gastrointestinal: Negative for abdominal pain, diarrhea, nausea and vomiting.   Skin: Negative for wound.   Neurological: Positive for weakness (s/p CVA with right-sided weakness). Negative for headaches.   Psychiatric/Behavioral: Negative for confusion.       Physical Exam     Initial Vitals [12/04/17 0911]   BP Pulse Resp Temp SpO2   (!) 120/97 83 18 97.7 °F (36.5 °C) 96 %      MAP       104.67         Physical Exam    Nursing note and vitals reviewed.  Constitutional: Vital signs are normal. He appears well-developed and  well-nourished. He is Obese . He is active and cooperative. He is easily aroused.  Non-toxic appearance. He does not have a sickly appearance. He does not appear ill. No distress.   HENT:   Head: Normocephalic and atraumatic.   Eyes: Conjunctivae are normal.   Neck: Normal range of motion.   Cardiovascular: Normal rate, regular rhythm and normal heart sounds.   Pulses:       Radial pulses are 2+ on the right side, and 2+ on the left side.        Dorsalis pedis pulses are 2+ on the right side, and 2+ on the left side.   Pulmonary/Chest: Effort normal. He has decreased breath sounds. He has wheezes. He exhibits tenderness. He exhibits no bony tenderness, no crepitus, no edema and no swelling.       Abdominal: Soft. Normal appearance and bowel sounds are normal. He exhibits no distension. There is no tenderness. There is no rigidity, no rebound and no guarding.   Neurological: He is alert, oriented to person, place, and time and easily aroused. GCS eye subscore is 4. GCS verbal subscore is 5. GCS motor subscore is 6.   S/p CVA right sided weakness- Chronic   Skin: Skin is warm, dry and intact. No bruising and no rash noted. No erythema.   Psychiatric: He has a normal mood and affect. His speech is normal and behavior is normal. Judgment and thought content normal. Cognition and memory are normal.         ED Course   Procedures  Labs Reviewed   CBC W/ AUTO DIFFERENTIAL - Abnormal; Notable for the following:        Result Value    MCHC 31.7 (*)     RDW 15.2 (*)     Eos # 0.6 (*)     All other components within normal limits   TROPONIN I - Abnormal; Notable for the following:     Troponin I 0.054 (*)     All other components within normal limits   B-TYPE NATRIURETIC PEPTIDE - Abnormal; Notable for the following:      (*)     All other components within normal limits   TROPONIN I - Abnormal; Notable for the following:     Troponin I 0.047 (*)     All other components within normal limits   COMPREHENSIVE METABOLIC  PANEL   PROTIME-INR   APTT   TSH     Imaging Results          X-Ray Chest 1 View (Final result)  Result time 12/04/17 11:05:06    Final result by Jerson Lovelace MD (12/04/17 11:05:06)                 Impression:     As above.      Electronically signed by: JERSON LOVELACE MD  Date:     12/04/17  Time:    11:05              Narrative:    Chest one view.    Findings: There is elevation of the left hemidiaphragm. There is minimal left basilar opacity likely related to atelectasis cannot exclude a small infiltrate. There is no pneumothorax. The cardiac silhouette appears prominent. The osseous structures demonstrate degenerative change.                                      Medical Decision Making:   History:   I obtained history from: someone other than patient and EMS provider.       <> Summary of History: Patient and EMS  Initial Assessment:   82-year-old male presents to the ED via EMS for right rib pain.  Per EMS, the nursing home staff report chronic rib pain due to his posture and is wheelchair.  No trauma, fever/chills, cough, chest pain, shortness of breath, abdominal pain, vomiting, or rash.  EMS was originally called to the nursing home for a choking episode without complete airway obstruction which the patient resolved with coughing.  The patient appears chronically ill but nontoxic.  Status post CVA with lasting right-sided weakness.  Heart rate regular rate and rhythm.  Lungs with diffuse wheezing bilaterally.  Reproducible right rib pain without crepitus or bony tenderness.  No step-offs or signs of trauma.  Abdomen soft, nontender.  No rebound, rigidity, or guarding.  No rash or signs of trauma.  Differential Diagnosis:   Chronic pain, costochondritis, fracture, pneumonia, STEMI, non-STEMI, electrolyte arrangement, dysrhythmia, pleural effusion, pneumothorax  Clinical Tests:   Lab Tests: Ordered and Reviewed  Radiological Study: Ordered and Reviewed  Medical Tests: Ordered and Reviewed  ED Management:  Labs,  EKG, chest x-ray, IV morphine, albuterol nebulizer treatment  1431: Awaiting repeat troponin.  Lab states that analyzer is down and they're attempting to fix the problem.  X-ray read by radiologist and reviewed by me.  There is elevation of the left hemidiaphragm and minimal left basilar opacity which could be atelectasis or infiltrate.  WBC normal, and patient is afebrile.  I do not suspect pneumonia.  I feel the patient's chest pain is due to chest wall pain which is chronic.  I have a low suspicion for ACS.  Initial troponin 0.054 and repeat troponin is 0.047.  Lungs with significantly improved breath sounds after nebulizer treatment.  The patient is on nebulizer treatments at the nursing home.  I advised the patient to follow up with his PCP within one day, and return to the ER if condition changes, progresses, or if there are any concerns.  Patient verbalized understanding, compliance, and agreement with the treatment plan.  He reports that he feels comfortable with discharge at this time.              Attending Attestation:     Physician Attestation Statement for NP/PA:   I have conducted a face to face encounter with this patient in addition to the NP/PA, due to NP/PA Request    Other NP/PA Attestation Additions:    History of Present Illness: 82-year-old male with right sided chest pain.   Physical Exam: There is tenderness of the right lower anterolateral chest wall.                  ED Course      Clinical Impression:   The primary encounter diagnosis was Chest wall pain. Diagnoses of Chest pain and Mild intermittent asthma with exacerbation were also pertinent to this visit.                           JALIL Shane  12/04/17 2019       Jerry Franco MD  12/05/17 2814

## 2017-12-04 NOTE — ED NOTES
Pt's power of , Dylon Hayward, contacted and notified pt is pending transport back to [facility].

## 2017-12-22 ENCOUNTER — TELEPHONE (OUTPATIENT)
Dept: INTERNAL MEDICINE | Facility: CLINIC | Age: 82
End: 2017-12-22

## 2017-12-22 NOTE — TELEPHONE ENCOUNTER
----- Message from Pooja Dhaliwal sent at 2017  8:48 AM CST -----  Contact: Lyla Sweeney from [facility]/ [phone]  Nurse called in to speak with you. Patient  and she needs to know if Dr. Araujo would be the doctor to sign the death certificate.     Please call and advise.